# Patient Record
Sex: FEMALE | Race: WHITE | NOT HISPANIC OR LATINO | Employment: UNEMPLOYED | URBAN - METROPOLITAN AREA
[De-identification: names, ages, dates, MRNs, and addresses within clinical notes are randomized per-mention and may not be internally consistent; named-entity substitution may affect disease eponyms.]

---

## 2018-03-12 ENCOUNTER — TELEPHONE (OUTPATIENT)
Dept: MEDICAL GROUP | Facility: MEDICAL CENTER | Age: 28
End: 2018-03-12

## 2018-03-12 ENCOUNTER — OFFICE VISIT (OUTPATIENT)
Dept: MEDICAL GROUP | Facility: MEDICAL CENTER | Age: 28
End: 2018-03-12
Payer: MEDICAID

## 2018-03-12 VITALS
HEIGHT: 65 IN | HEART RATE: 81 BPM | SYSTOLIC BLOOD PRESSURE: 116 MMHG | RESPIRATION RATE: 16 BRPM | WEIGHT: 166.2 LBS | TEMPERATURE: 99.1 F | BODY MASS INDEX: 27.69 KG/M2 | DIASTOLIC BLOOD PRESSURE: 78 MMHG | OXYGEN SATURATION: 98 %

## 2018-03-12 DIAGNOSIS — F17.200 TOBACCO DEPENDENCE: ICD-10-CM

## 2018-03-12 DIAGNOSIS — R21 RASH: ICD-10-CM

## 2018-03-12 DIAGNOSIS — Z00.00 WELLNESS EXAMINATION: ICD-10-CM

## 2018-03-12 DIAGNOSIS — Z13.6 SCREENING FOR CARDIOVASCULAR CONDITION: ICD-10-CM

## 2018-03-12 DIAGNOSIS — F41.9 ANXIETY: ICD-10-CM

## 2018-03-12 PROCEDURE — 99204 OFFICE O/P NEW MOD 45 MIN: CPT | Performed by: PHYSICIAN ASSISTANT

## 2018-03-12 RX ORDER — TRIAMCINOLONE ACETONIDE 1 MG/G
1 CREAM TOPICAL 2 TIMES DAILY
Qty: 1 TUBE | Refills: 1 | Status: SHIPPED | OUTPATIENT
Start: 2018-03-12 | End: 2018-03-22

## 2018-03-12 ASSESSMENT — PATIENT HEALTH QUESTIONNAIRE - PHQ9: CLINICAL INTERPRETATION OF PHQ2 SCORE: 0

## 2018-03-12 NOTE — TELEPHONE ENCOUNTER
Please let patient's sister know that it is probably a good idea to wait on Chantix until she sees psychiatrist first. I will place the referral today. Also Sade needs to sign a consent for us to discuss her medical/psychological history with her sister. Thanks! Natalie Roth PA-C

## 2018-03-12 NOTE — TELEPHONE ENCOUNTER
Spoke to sister, advised to stop chantix for now. They are looking into herbal remedies for smoking cessation. Will have sister gets labs done and call me back to get follow up scheduled after she knows her work schedule. Understand and will sign consent to be able to share information with her in the future.

## 2018-03-12 NOTE — LETTER
Critical [facility] hospital  Natalie Roth P.A.-C.  [address]  [facility] [address]  Fax: [phone]   Authorization for Release/Disclosure of   Protected Health Information   Name: CHOCO WRIGHT : 1990 SSN: [id number]   Address: [address] [place] [address] Road  [place] [address] Phone:    [phone] (home)    I authorize the entity listed below to release/disclose the PHI below to:   Critical access hospital/Natalie Roth P.A.-C. and Natalie Roth P.A.-C.   Provider or Entity Name:  [facility]   Address   City, State, [place], [place] Phone:      Fax:     Reason for request: continuity of care   Information to be released:    [  ] LAST COLONOSCOPY,  including any PATH REPORT and follow-up  [  ] LAST FIT/COLOGUARD RESULT [  ] LAST DEXA  [  ] LAST MAMMOGRAM  [  ] LAST PAP  [  ] LAST LABS [  ] RETINA EXAM REPORT  [  ] IMMUNIZATION RECORDS  [  ] Release all info      [  ] Check here and initial the line next to each item to release ALL health information INCLUDING  _____ Care and treatment for drug and / or alcohol abuse  _____ HIV testing, infection status, or AIDS  _____ Genetic Testing    DATES OF SERVICE OR TIME PERIOD TO BE DISCLOSED: _____________  I understand and acknowledge that:  * This Authorization may be revoked at any time by you in writing, except if your health information has already been used or disclosed.  * Your health information that will be used or disclosed as a result of you signing this authorization could be re-disclosed by the recipient. If this occurs, your re-disclosed health information may no longer be protected by State or Federal laws.  * You may refuse to sign this Authorization. Your refusal will not affect your ability to obtain treatment.  * This Authorization becomes effective upon signing and will  on (date) __________.      If no date is indicated, this Authorization will  one (1) year from the signature date.    Name: Choco Wright    Signature:   Date:          3/12/2018       PLEASE FAX REQUESTED RECORDS BACK TO: [phone]
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
## 2018-03-12 NOTE — PROGRESS NOTES
Chief Complaint:     Sade Wright is a 28 y.o. female who presents for annual exam    Pt has GYN provider: last pap/pelvic within 2 years  Looking for a job. No kids.    Smoking. Since age 14. Has not tried anything before.   Anxiety. About 2 years. No depression. All day, every day. Has had panic attacks but mostly just a general feeling of being anxious. No meds or treatments tried.       She  has no past medical history on file.no heart or lung disease. No diabetes or immune disorder  She  has no past surgical history on file.ages 19 and 20, bunion surgery both feet    No current outpatient prescriptions on file.     No current facility-administered medications for this visit.      Social History   Substance Use Topics   • Smoking status: Current Every Day Smoker     Packs/day: 2.00     Types: Cigarettes   • Smokeless tobacco: Never Used      Comment: 1-2 packs   • Alcohol use Yes      Comment: beer occasionally       Review Of Systems  Denies fever, chills, or sweats, unexplained weight changes  Skin: negative for rash, changing moles, abnormal pigmentation, hair or nail changes.  Eyes: negative for visual blurring, double vision, eye pain, floaters and discharge from eyes  Ears/Nose/Throat: negative for tinnitus, vertigo, oral or dental problem, hoarseness, frequent URI's, sinus trouble, persistent sore throat  Respiratory: negative for persistent cough, hemoptysis, dyspnea, wheezing  Cardiovascular: negative for palpitations, tachycardia, irregular heart beat, chest pain or pressure or peripheral edema.  Breast: Denies breast tenderness, mass,  changes in size or contour, or abnormal cyclic discomfort.  Gastrointestinal: negative for dysphagia or odynophagia, nausea, heartburn or reflux, abdominal pain, hemorrhoids, constipation or diarrhea, black stool or bloody stool  Genitourinary: negative for nocturia, dysuria, frequency, incontinence, abnormal vaginal discharge, dysparunia or abnormal vaginal  "bleeding  Musculoskeletal: negative for joint swelling and muscle pain/ soreness  Neurologic: negative for new or changing headaches, new weakness tremor  Psychiatric: negative for mood or sleep disturbance, anxiety, depression, sexual difficulties  Hematologic/Lymphatic/Immunologic: negative for pallor, unusual bruising, swollen glands, HIV risk factors  Endocrine: negative for temperature intolerance, polydipsia, polyuria.       PHYSICAL EXAMINATION:  Blood pressure 116/78, pulse 81, temperature 37.3 °C (99.1 °F), resp. rate 16, height 1.651 m (5' 5\"), weight 75.4 kg (166 lb 3.2 oz), last menstrual period 03/05/2018, SpO2 98 %.  Body mass index is 27.66 kg/m².  Wt Readings from Last 4 Encounters:   03/12/18 75.4 kg (166 lb 3.2 oz)       Constitutional: Alert, no distress.  Skin: Warm, dry, good turgor, no rashes or suspicious lesions in visible areas.  Eye: pupils equal, round and reactive to light, conjunctivae clear, lids normal.  ENMT: Lips without lesions, good dentition, oropharynx clear. Pinnae skin normal with no lesions. TM pearly gray with normal light reflex.   Neck: supple, no masses. No anterior or supraclavicular adenopathy. No carotid bruits no thyromegaly.  Respiratory: Unlabored respiratory effort, lungs clear to auscultation, no wheezes, no ronchi.  Cardiovascular: Normal S1, S2, no murmur, no peripheral edema.  Abdomen: no CVAT abdomen Soft, non-tender, no masses, no hepatosplenomegaly.  Psych: Alert and oriented x3, normal affect and mood.  Musc/Skel: 5/5 strength and normal motion UE and LE proximal and distal.        ASSESSMENT/PLAN:  1. Wellness examination       Yearly and as needed  "

## 2018-03-13 NOTE — ASSESSMENT & PLAN NOTE
"Patient states she has been smoking since age 14. Hasn't tried anything to quit before. Would like to try chantix. Doesn't think she could quit \"cold turkey\". She recently moved in with her sister who is encouraging her to quit.  "

## 2018-03-13 NOTE — PROGRESS NOTES
"Chief Complaint   Patient presents with   • Establish Care       HISTORY OF THE PRESENT ILLNESS: This is a 28 y.o. female new patient to our practice. This pleasant patient is here today to establish care. Recently moved to the area from [place]. Lives with sister. Trying to get a job at [employer]. Single, no kids.     Tobacco dependence  Patient states she has been smoking since age 14. Hasn't tried anything to quit before. Would like to try chantix. Doesn't think she could quit \"cold turkey\". She recently moved in with her sister who is encouraging her to quit.    Rash  bilat arms, itching, thinks it is the laundry detergent    Anxiety  Patient states chronic. No prior meds.       History reviewed. No pertinent past medical history.no heart or lung disease. No diabetes or immune disorder    Foot surgery, bilat, for bunions    Family Status   Relation Status   • Mother Alive   • Father Alive    UNKNOWN   • Sister Alive   • Brother Alive     Family History   Problem Relation Age of Onset   • Cancer Mother    • No Known Problems Father        Social History   Substance Use Topics   • Smoking status: Current Every Day Smoker     Packs/day: 2.00     Types: Cigarettes   • Smokeless tobacco: Never Used      Comment: 1-2 packs   • Alcohol use Yes      Comment: beer occasionally       Allergies: Penicillins; Sulfa drugs; and Tetanus antitoxin    On no regular daily meds    Review of Systems   Constitutional: Negative for fever, chills, weight loss and malaise/fatigue.   HENT: Negative for ear pain, nosebleeds, congestion, sore throat and neck pain.    Eyes: Negative for blurred vision.   Respiratory: Negative for cough, sputum production, shortness of breath and wheezing.    Cardiovascular: Negative for chest pain, palpitations, orthopnea and leg swelling.   Gastrointestinal: Negative for heartburn, nausea, vomiting and abdominal pain.   Genitourinary: Negative for dysuria, urgency and frequency.   Musculoskeletal: Negative " "for myalgias, back pain and joint pain.   Skin: Negative for rash and itching.   Neurological: Negative for dizziness, tingling, tremors, sensory change, focal weakness and headaches.   Endo/Heme/Allergies: Does not bruise/bleed easily.    All other systems reviewed and are negative except as in HPI.    Exam: Blood pressure 116/78, pulse 81, temperature 37.3 °C (99.1 °F), resp. rate 16, height 1.651 m (5' 5\"), weight 75.4 kg (166 lb 3.2 oz), last menstrual period 03/05/2018, SpO2 98 %.  General: Normal appearing. No distress.  HEENT: Normocephalic. Eyes conjunctiva clear lids without ptosis, pupils equal and reactive to light accommodation, ears normal shape and contour, canals are clear bilaterally, tympanic membranes are benign, nasal mucosa benign, oropharynx is without erythema, edema or exudates.   Neck: Supple without JVD or bruit. Thyroid is not enlarged.  Pulmonary: Clear to ausculation.  Normal effort. No rales, ronchi, or wheezing.  Cardiovascular: Regular rate and rhythm without murmur. Carotid and radial pulses are intact and equal bilaterally.  Neurologic: Grossly nonfocal  Lymph: No cervical, supraclavicular or axillary lymph nodes are palpable  Skin: Warm and dry.  No obvious lesions. Raised light red papules, bilat dorsal forearms  Musculoskeletal: Normal gait. No extremity cyanosis, clubbing, or edema.  Psych: Normal mood and affect. Alert and oriented x3. Judgment and insight is normal.  Assessment/Plan  1. Tobacco dependence  varenicline (CHANTIX AMARILIS) 0.5 MG X 11 & 1 MG X 42 tablet   2. Anxiety     3. Rash  triamcinolone acetonide (KENALOG) 0.1 % Cream   4. Wellness examination  CBC WITH DIFFERENTIAL    COMP METABOLIC PANEL    TSH WITH REFLEX TO FT4   5. Screening for cardiovascular condition  LIPID PROFILE     F/u 2 weeks. Discussed possible psychiatry referral for anxiety. Discussed chantix risk/benefit and potential side effects. Labs as ordered.  "

## 2018-03-16 ENCOUNTER — TELEPHONE (OUTPATIENT)
Dept: MEDICAL GROUP | Facility: MEDICAL CENTER | Age: 28
End: 2018-03-16

## 2018-03-16 LAB
ALBUMIN SERPL-MCNC: 4 G/DL (ref 3.5–5.5)
ALBUMIN/GLOB SERPL: 1.7 {RATIO} (ref 1.2–2.2)
ALP SERPL-CCNC: 74 IU/L (ref 39–117)
ALT SERPL-CCNC: 57 IU/L (ref 0–32)
AST SERPL-CCNC: 42 IU/L (ref 0–40)
BASOPHILS # BLD AUTO: 0.1 X10E3/UL (ref 0–0.2)
BASOPHILS NFR BLD AUTO: 2 %
BILIRUB SERPL-MCNC: 0.2 MG/DL (ref 0–1.2)
BUN SERPL-MCNC: 18 MG/DL (ref 6–20)
BUN/CREAT SERPL: 28 (ref 9–23)
CALCIUM SERPL-MCNC: 9.3 MG/DL (ref 8.7–10.2)
CHLORIDE SERPL-SCNC: 104 MMOL/L (ref 96–106)
CHOLEST SERPL-MCNC: 202 MG/DL (ref 100–199)
CO2 SERPL-SCNC: 23 MMOL/L (ref 18–29)
CREAT SERPL-MCNC: 0.65 MG/DL (ref 0.57–1)
EOSINOPHIL # BLD AUTO: 0.3 X10E3/UL (ref 0–0.4)
EOSINOPHIL NFR BLD AUTO: 4 %
ERYTHROCYTE [DISTWIDTH] IN BLOOD BY AUTOMATED COUNT: 14.1 % (ref 12.3–15.4)
GFR SERPLBLD CREATININE-BSD FMLA CKD-EPI: 123 ML/MIN/1.73
GFR SERPLBLD CREATININE-BSD FMLA CKD-EPI: 142 ML/MIN/1.73
GLOBULIN SER CALC-MCNC: 2.3 G/DL (ref 1.5–4.5)
GLUCOSE SERPL-MCNC: 81 MG/DL (ref 65–99)
HCT VFR BLD AUTO: 41.4 % (ref 34–46.6)
HDLC SERPL-MCNC: 75 MG/DL
HGB BLD-MCNC: 13.4 G/DL (ref 11.1–15.9)
IMM GRANULOCYTES # BLD: 0 X10E3/UL (ref 0–0.1)
IMM GRANULOCYTES NFR BLD: 0 %
IMMATURE CELLS  115398: NORMAL
LABORATORY COMMENT REPORT: ABNORMAL
LDLC SERPL CALC-MCNC: 112 MG/DL (ref 0–99)
LYMPHOCYTES # BLD AUTO: 2.4 X10E3/UL (ref 0.7–3.1)
LYMPHOCYTES NFR BLD AUTO: 38 %
MCH RBC QN AUTO: 30.5 PG (ref 26.6–33)
MCHC RBC AUTO-ENTMCNC: 32.4 G/DL (ref 31.5–35.7)
MCV RBC AUTO: 94 FL (ref 79–97)
MONOCYTES # BLD AUTO: 0.6 X10E3/UL (ref 0.1–0.9)
MONOCYTES NFR BLD AUTO: 9 %
MORPHOLOGY BLD-IMP: NORMAL
NEUTROPHILS # BLD AUTO: 3 X10E3/UL (ref 1.4–7)
NEUTROPHILS NFR BLD AUTO: 47 %
NRBC BLD AUTO-RTO: NORMAL %
PLATELET # BLD AUTO: 352 X10E3/UL (ref 150–379)
POTASSIUM SERPL-SCNC: 4.9 MMOL/L (ref 3.5–5.2)
PROT SERPL-MCNC: 6.3 G/DL (ref 6–8.5)
RBC # BLD AUTO: 4.4 X10E6/UL (ref 3.77–5.28)
SODIUM SERPL-SCNC: 141 MMOL/L (ref 134–144)
TRIGL SERPL-MCNC: 75 MG/DL (ref 0–149)
TSH SERPL DL<=0.005 MIU/L-ACNC: 1.62 UIU/ML (ref 0.45–4.5)
VLDLC SERPL CALC-MCNC: 15 MG/DL (ref 5–40)
WBC # BLD AUTO: 6.3 X10E3/UL (ref 3.4–10.8)

## 2018-03-16 NOTE — TELEPHONE ENCOUNTER
Spoke to pt scheduled an appointment for Monday, seemed hesitant to have her sister present for the appointment. Stated she would rather her sister's  be present for the appointment so he will be coming with her on Monday.

## 2018-03-16 NOTE — TELEPHONE ENCOUNTER
----- Message from Natalie Roth P.A.-C. sent at 3/16/2018 11:27 AM PDT -----  Please call patient. Labs are for the most part normal. Liver enzymes are a little elevated as well as cholesterol. Please have her schedule a follow up (ask her sister to attend) to discuss labs, anxiety and skin rash. Thank you! Natalie Roth PA-C

## 2018-03-19 ENCOUNTER — OFFICE VISIT (OUTPATIENT)
Dept: MEDICAL GROUP | Facility: MEDICAL CENTER | Age: 28
End: 2018-03-19
Payer: MEDICAID

## 2018-03-19 VITALS
SYSTOLIC BLOOD PRESSURE: 122 MMHG | TEMPERATURE: 99 F | BODY MASS INDEX: 27.76 KG/M2 | OXYGEN SATURATION: 96 % | HEART RATE: 95 BPM | DIASTOLIC BLOOD PRESSURE: 80 MMHG | RESPIRATION RATE: 16 BRPM | WEIGHT: 166.6 LBS | HEIGHT: 65 IN

## 2018-03-19 DIAGNOSIS — F17.200 TOBACCO DEPENDENCE: ICD-10-CM

## 2018-03-19 DIAGNOSIS — R74.8 ELEVATED LIVER ENZYMES: ICD-10-CM

## 2018-03-19 DIAGNOSIS — F41.9 ANXIETY: ICD-10-CM

## 2018-03-19 DIAGNOSIS — R21 RASH: ICD-10-CM

## 2018-03-19 PROCEDURE — 99214 OFFICE O/P EST MOD 30 MIN: CPT | Performed by: PHYSICIAN ASSISTANT

## 2018-03-19 RX ORDER — BUSPIRONE HYDROCHLORIDE 7.5 MG/1
7.5 TABLET ORAL DAILY
Qty: 30 TAB | Refills: 3 | Status: SHIPPED | OUTPATIENT
Start: 2018-03-19 | End: 2018-04-18

## 2018-03-19 NOTE — PROGRESS NOTES
"Subjective:   Sade Wright is a 28 y.o. female here today for follow up. Here with sister.    Tobacco dependence  Used chantix for 3 days and actually felt improvement. Stopped after having a vivid bad dream. Still not smoking now. Plans to try natural remedies.     Rash  Feels improvement with the steroid cream, still has some rash, no itching    Anxiety  Would like to try a medication. Doesn't want to see a therapist. Denies depression symptoms. Hasn't been on medication previously.    Elevated liver enzymes  AST(SGOT) 42   0 - 40 IU/L Final   ALT(SGPT) 57   0 - 32 IU/L Final     Patient denies taking tylenol or drinking alcohol. Will recheck one month.       Current medicines (including changes today)  Current Outpatient Prescriptions   Medication Sig Dispense Refill   • busPIRone (BUSPAR) 7.5 MG tablet Take 1 Tab by mouth every day for 30 days. 30 Tab 3   • triamcinolone acetonide (KENALOG) 0.1 % Cream Apply 1 Application to affected area(s) 2 times a day for 10 days. 1 Tube 1   • varenicline (CHANTIX AMARILIS) 0.5 MG X 11 & 1 MG X 42 tablet Use AMARILIS as directed 56 Tab 0     No current facility-administered medications for this visit.      She  has no past medical history on file.    ROS   No fever/chills. No weight change. No headache/dizziness. No focal weakness. No sore throat, nasal congestion, ear pain. No chest pain, no shortness of breath, difficulty breathing. No n/v/d/c or abdominal pain. No urinary complaint.      Objective:     Blood pressure 122/80, pulse 95, temperature 37.2 °C (99 °F), resp. rate 16, height 1.651 m (5' 5\"), weight 75.6 kg (166 lb 9.6 oz), last menstrual period 03/05/2018, SpO2 96 %. Body mass index is 27.72 kg/m².   Physical Exam:  Constitutional: WDWN, NAD  Skin: Warm, dry, good turgor, no rashes in visible areas.  Psych: Alert and oriented x3, normal affect and mood.        Assessment and Plan:   The following treatment plan was discussed    1. Anxiety    - busPIRone (BUSPAR) 7.5 MG " tablet; Take 1 Tab by mouth every day for 30 days.  Dispense: 30 Tab; Refill: 3    2. Tobacco dependence  Encouraged continued abstinence from smoking    3. Rash  Cont cream, f/u 2 weeks    4. Elevated liver enzymes  Recheck one month      Followup:2 weeks

## 2018-03-19 NOTE — ASSESSMENT & PLAN NOTE
Would like to try a medication. Doesn't want to see a therapist. Denies depression symptoms. Hasn't been on medication previously.

## 2018-03-19 NOTE — ASSESSMENT & PLAN NOTE
Used chantix for 3 days and actually felt improvement. Stopped after having a vivid bad dream. Still not smoking now. Plans to try natural remedies.

## 2018-03-19 NOTE — ASSESSMENT & PLAN NOTE
AST(SGOT) 42   0 - 40 IU/L Final   ALT(SGPT) 57   0 - 32 IU/L Final     Patient denies taking tylenol or drinking alcohol. Will recheck one month.